# Patient Record
Sex: FEMALE | Race: WHITE | NOT HISPANIC OR LATINO | URBAN - METROPOLITAN AREA
[De-identification: names, ages, dates, MRNs, and addresses within clinical notes are randomized per-mention and may not be internally consistent; named-entity substitution may affect disease eponyms.]

---

## 2020-12-22 ENCOUNTER — OFFICE (OUTPATIENT)
Dept: URBAN - METROPOLITAN AREA TELEHEALTH 12 | Facility: TELEHEALTH | Age: 70
End: 2020-12-22
Payer: OTHER GOVERNMENT

## 2020-12-22 VITALS — WEIGHT: 148 LBS | HEIGHT: 66 IN

## 2020-12-22 DIAGNOSIS — R19.5 OTHER FECAL ABNORMALITIES: ICD-10-CM

## 2020-12-22 DIAGNOSIS — K57.30 DIVERTICULOSIS OF LARGE INTESTINE WITHOUT PERFORATION OR ABS: ICD-10-CM

## 2020-12-22 DIAGNOSIS — R19.7 DIARRHEA, UNSPECIFIED: ICD-10-CM

## 2020-12-22 PROCEDURE — 99204 OFFICE O/P NEW MOD 45 MIN: CPT | Mod: 95 | Performed by: PHYSICIAN ASSISTANT

## 2020-12-22 NOTE — SERVICEHPINOTES
PATIENT VERIFIED BY DATE OF BIRTH AND NAME. Patient has been consented for this telecommunication visit.   She has sporadic diarrhea intermittently. Symptoms first began one year ago. When it occurs, lasts for 24 hours and has a lot of urgency. She takes Kaopectate which helps. The episodes can occur as frequently as a few weeks apart. No sick contacts or specific illicit food triggers. Can go a few months without symptoms. In between these episodes, BMs are softer than normal but did have a regular hard stool today. Tends to have two BMs per day. No nocturnal stools and no blood in her stool. Will get abdominal cramping when she has the attacks of diarrhea and she uses a heating pad which helps. No abdominal pain otherwise. No weight loss. As her TSH was low, and her Synthroid was increased. Since then, she hasn't had a diarrhea episode since then. Takes NSAIDs frequently for back issues. Her last colonoscopy was 10 yrs ago which shows diverticulosis. She had diverticulitis surgery about 15 yrs ago. No family hx of CRC.      Normal CBC, CMP is normal except low Ca level and TSH is low as well.

## 2021-03-25 ENCOUNTER — ON CAMPUS - OUTPATIENT (OUTPATIENT)
Dept: URBAN - METROPOLITAN AREA HOSPITAL 16 | Facility: HOSPITAL | Age: 71
End: 2021-03-25
Payer: OTHER GOVERNMENT

## 2021-03-25 DIAGNOSIS — R19.7 DIARRHEA, UNSPECIFIED: ICD-10-CM

## 2021-03-25 DIAGNOSIS — K57.30 DIVERTICULOSIS OF LARGE INTESTINE WITHOUT PERFORATION OR ABS: ICD-10-CM

## 2021-03-25 PROCEDURE — 45380 COLONOSCOPY AND BIOPSY: CPT | Performed by: INTERNAL MEDICINE
